# Patient Record
Sex: FEMALE | Race: WHITE | URBAN - NONMETROPOLITAN AREA
[De-identification: names, ages, dates, MRNs, and addresses within clinical notes are randomized per-mention and may not be internally consistent; named-entity substitution may affect disease eponyms.]

---

## 2022-01-13 ENCOUNTER — OFFICE VISIT (OUTPATIENT)
Dept: URBAN - NONMETROPOLITAN AREA CLINIC 7 | Facility: CLINIC | Age: 72
End: 2022-01-13
Payer: COMMERCIAL

## 2022-01-13 DIAGNOSIS — H04.123 TEAR FILM INSUFFICIENCY OF BILATERAL LACRIMAL GLANDS: Primary | ICD-10-CM

## 2022-01-13 DIAGNOSIS — Z96.1 PRESENCE OF INTRAOCULAR LENS: ICD-10-CM

## 2022-01-13 DIAGNOSIS — H52.4 PRESBYOPIA: ICD-10-CM

## 2022-01-13 PROCEDURE — 92004 COMPRE OPH EXAM NEW PT 1/>: CPT | Performed by: OPTOMETRIST

## 2022-01-13 ASSESSMENT — INTRAOCULAR PRESSURE
OS: 14
OD: 13

## 2022-01-13 ASSESSMENT — VISUAL ACUITY
OS: 20/20
OD: 20/20

## 2022-01-13 ASSESSMENT — KERATOMETRY
OS: 42.83
OD: 43.13

## 2022-01-13 NOTE — IMPRESSION/PLAN
Impression: Presence of intraocular lens: Z96.1. Bilateral Plan: Lens implant(s) appear stable. Continue to monitor.

## 2022-01-13 NOTE — IMPRESSION/PLAN
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 Plan: I encouraged use of artificial tears, a minimum of BID.

## 2023-01-16 ENCOUNTER — OFFICE VISIT (OUTPATIENT)
Dept: URBAN - NONMETROPOLITAN AREA CLINIC 7 | Facility: CLINIC | Age: 73
End: 2023-01-16
Payer: MEDICARE

## 2023-01-16 DIAGNOSIS — H04.123 TEAR FILM INSUFFICIENCY OF BILATERAL LACRIMAL GLANDS: Primary | ICD-10-CM

## 2023-01-16 DIAGNOSIS — Z96.1 PRESENCE OF INTRAOCULAR LENS: ICD-10-CM

## 2023-01-16 PROCEDURE — 92014 COMPRE OPH EXAM EST PT 1/>: CPT | Performed by: OPTOMETRIST

## 2023-01-16 ASSESSMENT — VISUAL ACUITY
OS: 20/20
OD: 20/20

## 2023-01-16 ASSESSMENT — INTRAOCULAR PRESSURE
OD: 14
OS: 14

## 2023-01-16 NOTE — IMPRESSION/PLAN
Impression: Presence of intraocular lens: Z96.1. Bilateral Plan: The lens implant(s) appear clear and stable. Continue to monitor.

## 2023-01-16 NOTE — IMPRESSION/PLAN
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 Plan: Continue use of artificial tears, a minimum of BID-TID.